# Patient Record
Sex: MALE | Race: BLACK OR AFRICAN AMERICAN
[De-identification: names, ages, dates, MRNs, and addresses within clinical notes are randomized per-mention and may not be internally consistent; named-entity substitution may affect disease eponyms.]

---

## 2022-06-23 ENCOUNTER — HOSPITAL ENCOUNTER (OUTPATIENT)
Dept: HOSPITAL 47 - EC | Age: 62
Setting detail: OBSERVATION
LOS: 1 days | Discharge: HOME | End: 2022-06-24
Attending: INTERNAL MEDICINE | Admitting: INTERNAL MEDICINE
Payer: COMMERCIAL

## 2022-06-23 DIAGNOSIS — Z79.899: ICD-10-CM

## 2022-06-23 DIAGNOSIS — F10.10: ICD-10-CM

## 2022-06-23 DIAGNOSIS — F31.9: ICD-10-CM

## 2022-06-23 DIAGNOSIS — Z71.41: ICD-10-CM

## 2022-06-23 DIAGNOSIS — G89.29: ICD-10-CM

## 2022-06-23 DIAGNOSIS — Z71.6: ICD-10-CM

## 2022-06-23 DIAGNOSIS — F14.90: ICD-10-CM

## 2022-06-23 DIAGNOSIS — F20.9: ICD-10-CM

## 2022-06-23 DIAGNOSIS — F41.9: ICD-10-CM

## 2022-06-23 DIAGNOSIS — K21.9: ICD-10-CM

## 2022-06-23 DIAGNOSIS — M54.50: ICD-10-CM

## 2022-06-23 DIAGNOSIS — F17.210: ICD-10-CM

## 2022-06-23 DIAGNOSIS — Z88.6: ICD-10-CM

## 2022-06-23 DIAGNOSIS — Z87.11: ICD-10-CM

## 2022-06-23 DIAGNOSIS — R07.89: Primary | ICD-10-CM

## 2022-06-23 DIAGNOSIS — Z71.51: ICD-10-CM

## 2022-06-23 LAB
ALBUMIN SERPL-MCNC: 3.6 G/DL (ref 3.5–5)
ALP SERPL-CCNC: 66 U/L (ref 38–126)
ALT SERPL-CCNC: 27 U/L (ref 4–49)
ANION GAP SERPL CALC-SCNC: 5 MMOL/L
APTT BLD: 24.9 SEC (ref 22–30)
AST SERPL-CCNC: 52 U/L (ref 17–59)
BASOPHILS # BLD AUTO: 0 K/UL (ref 0–0.2)
BASOPHILS NFR BLD AUTO: 1 %
BUN SERPL-SCNC: 13 MG/DL (ref 9–20)
CALCIUM SPEC-MCNC: 8.4 MG/DL (ref 8.4–10.2)
CHLORIDE SERPL-SCNC: 105 MMOL/L (ref 98–107)
CO2 SERPL-SCNC: 27 MMOL/L (ref 22–30)
EOSINOPHIL # BLD AUTO: 0.2 K/UL (ref 0–0.7)
EOSINOPHIL NFR BLD AUTO: 3 %
ERYTHROCYTE [DISTWIDTH] IN BLOOD BY AUTOMATED COUNT: 5.02 M/UL (ref 4.3–5.9)
ERYTHROCYTE [DISTWIDTH] IN BLOOD: 12.7 % (ref 11.5–15.5)
GLUCOSE SERPL-MCNC: 92 MG/DL (ref 74–99)
HCT VFR BLD AUTO: 43.9 % (ref 39–53)
HGB BLD-MCNC: 14.6 GM/DL (ref 13–17.5)
INR PPP: 1 (ref ?–1.2)
LYMPHOCYTES # SPEC AUTO: 1.1 K/UL (ref 1–4.8)
LYMPHOCYTES NFR SPEC AUTO: 23 %
MAGNESIUM SPEC-SCNC: 2.1 MG/DL (ref 1.6–2.3)
MCH RBC QN AUTO: 29.1 PG (ref 25–35)
MCHC RBC AUTO-ENTMCNC: 33.3 G/DL (ref 31–37)
MCV RBC AUTO: 87.4 FL (ref 80–100)
MONOCYTES # BLD AUTO: 0.3 K/UL (ref 0–1)
MONOCYTES NFR BLD AUTO: 6 %
NEUTROPHILS # BLD AUTO: 3.2 K/UL (ref 1.3–7.7)
NEUTROPHILS NFR BLD AUTO: 66 %
PH UR: 7.5 [PH] (ref 5–8)
PLATELET # BLD AUTO: 175 K/UL (ref 150–450)
POTASSIUM SERPL-SCNC: 4.5 MMOL/L (ref 3.5–5.1)
PROT SERPL-MCNC: 5.6 G/DL (ref 6.3–8.2)
PT BLD: 10.5 SEC (ref 9–12)
SODIUM SERPL-SCNC: 137 MMOL/L (ref 137–145)
SP GR UR: 1.01 (ref 1–1.03)
UROBILINOGEN UR QL STRIP: <2 MG/DL (ref ?–2)
WBC # BLD AUTO: 4.8 K/UL (ref 3.8–10.6)

## 2022-06-23 PROCEDURE — 93005 ELECTROCARDIOGRAM TRACING: CPT

## 2022-06-23 PROCEDURE — 96372 THER/PROPH/DIAG INJ SC/IM: CPT

## 2022-06-23 PROCEDURE — 96360 HYDRATION IV INFUSION INIT: CPT

## 2022-06-23 PROCEDURE — 80061 LIPID PANEL: CPT

## 2022-06-23 PROCEDURE — 83735 ASSAY OF MAGNESIUM: CPT

## 2022-06-23 PROCEDURE — 85610 PROTHROMBIN TIME: CPT

## 2022-06-23 PROCEDURE — 99285 EMERGENCY DEPT VISIT HI MDM: CPT

## 2022-06-23 PROCEDURE — 85730 THROMBOPLASTIN TIME PARTIAL: CPT

## 2022-06-23 PROCEDURE — 84484 ASSAY OF TROPONIN QUANT: CPT

## 2022-06-23 PROCEDURE — 81003 URINALYSIS AUTO W/O SCOPE: CPT

## 2022-06-23 PROCEDURE — 85025 COMPLETE CBC W/AUTO DIFF WBC: CPT

## 2022-06-23 PROCEDURE — 80053 COMPREHEN METABOLIC PANEL: CPT

## 2022-06-23 PROCEDURE — 96361 HYDRATE IV INFUSION ADD-ON: CPT

## 2022-06-23 PROCEDURE — 71046 X-RAY EXAM CHEST 2 VIEWS: CPT

## 2022-06-23 PROCEDURE — 93306 TTE W/DOPPLER COMPLETE: CPT

## 2022-06-23 PROCEDURE — 36415 COLL VENOUS BLD VENIPUNCTURE: CPT

## 2022-06-23 RX ADMIN — CEFAZOLIN SCH MLS/HR: 330 INJECTION, POWDER, FOR SOLUTION INTRAMUSCULAR; INTRAVENOUS at 14:29

## 2022-06-23 RX ADMIN — HEPARIN SODIUM SCH UNIT: 5000 INJECTION INTRAVENOUS; SUBCUTANEOUS at 23:52

## 2022-06-23 RX ADMIN — HEPARIN SODIUM SCH UNIT: 5000 INJECTION INTRAVENOUS; SUBCUTANEOUS at 18:52

## 2022-06-23 NOTE — ED
Chest Pain HPI





- General


Chief Complaint: Chest Pain


Stated Complaint: Chest Pain


Time Seen by Provider: 06/23/22 10:38


Source: patient, EMS


Mode of arrival: EMS


Limitations: no limitations





- History of Present Illness


Initial Comments: 


Patient is a 61-year-old male presenting with chief complaint of chest pain.  

Patient describes it as a substernal pain, he says it feels a combination of 

squeezing and needles stabbing him.  He states that it comes and goes and has 

been ongoing for the last 2 days.  He notices some radiation to the right arm 

and shoulder. He states that it randomly comes on, he notices no relationship 

between exertion and the pain.  Patient has a history of gastric ulcers. Patient

is arriving via EMS from [place], states that his last crack cocaine use 

was on the 15th.  He received 2 doses of nitro and 324 of aspirin prior to 

arrival.  He is also complaining of lower back pain with radiation down the left

leg.  He denies any loss of bowel or bladder control or saddle paresthesia.  No 

lower extremity weakness.  He denies any shortness of breath, fever, chills, 

nausea, vomiting, abdominal pain, back pain, headache, vision or hearing 

changes, dizziness, weakness.








- Related Data


                                Home Medications











 Medication  Instructions  Recorded  Confirmed


 


Acetaminophen [Tylenol 8 Hour] 650 mg PO Q4H PRN 06/23/22 06/23/22


 


Calc/Mag/Zinc/Vitam D 1 tab PO DAILY PRN 06/23/22 06/23/22


 


Chlorpheniramine Maleate 4 mg PO Q4H PRN 06/23/22 06/23/22





[Chlor-Trimeton]   


 


Docusate [Colace] 100 mg PO DAILY PRN 06/23/22 06/23/22


 


Loperamide HCl [Imodium A-D] 4 mg PO QID PRN 06/23/22 06/23/22


 


Mag Hydrox/Aluminum Hyd/Simeth 30 ml PO Q4H PRN 06/23/22 06/23/22





[Mylanta Maximum Strength Liq]   


 


Methyl Salicylate/Menthol [Icy Hot 1 applic TOPICAL AS DIRECTED PRN 06/23/22 06/23/22





10-30% Cream]   


 


Nicotine 14Mg/24Hr Patch [Habitrol 1 patch TRANSDERM DAILY 06/23/22 06/23/22





14Mg/24Hr Patch]   


 


Omeprazole 20 mg PO DAILY 06/23/22 06/23/22


 


ondansetron HCL [Zofran] 8 mg PO Q6H PRN 06/23/22 06/23/22


 


traZODone HCL [Desyrel] 50 - 150 mg PO HS PRN 06/23/22 06/23/22











                                    Allergies











Allergy/AdvReac Type Severity Reaction Status Date / Time


 


ibuprofen [From Motrin] AdvReac  Vomiting Verified 06/23/22 13:43














Review of Systems


ROS Statement: 


Those systems with pertinent positive or pertinent negative responses have been 

documented in the HPI.





ROS Other: All systems not noted in ROS Statement are negative.





EKG Findings





- EKG Comments:


EKG Findings:: Sinus rhythm with sinus arrhythmia rate of 72.  NC interval 124. 

QRS duration 81.  ST elevation seen in V2.  Reciprocal changes seen in leads III

and aVL. No previous EKG for comparison.  This EKG was also shown to and 

interpreted by my attending Dr. Kirk





Past Medical History


Additional Past Medical History / Comment(s): stomach ulcers.


History of Any Multi-Drug Resistant Organisms: None Reported


Smoking Status: Former smoker


Past Alcohol Use History: Occasional


Past Drug Use History: Cocaine, Marijuana





General Exam


Limitations: no limitations


General appearance: alert, in no apparent distress


Head exam: Present: atraumatic, normocephalic, normal inspection


Eye exam: Present: normal appearance, EOMI.  Absent: scleral icterus, 

periorbital swelling


Neck exam: Present: normal inspection


Respiratory exam: Present: normal lung sounds bilaterally.  Absent: respiratory 

distress, wheezes, rales, rhonchi, stridor


Cardiovascular Exam: Present: regular rate, normal rhythm, normal heart sounds. 

Absent: systolic murmur, diastolic murmur, rubs, gallop, clicks


GI/Abdominal exam: Present: soft, normal bowel sounds.  Absent: distended, 

tenderness, guarding, rebound, rigid


Extremities exam: Present: normal inspection, full ROM.  Absent: tenderness


Neurological exam: Present: alert, oriented X3, CN II-XII intact


  ** Expanded


Patient oriented to: Present: person, place, time


Speech: Present: fluid speech


Cranial nerves: EOM's Intact: Normal, Facial Sensation: Normal


Sensory exam: Upper Extremity Light Touch: Normal, Lower Extremity Light Touch: 

Normal


Motor strength exam: RUE: 5, LUE: 5, RLE: 5, LLE: 5


Eye Response: (4) open spontaneously


Motor Response: (6) obeys commands


Verbal Response: (5) oriented


Eda Total: 15


Psychiatric exam: Present: normal affect, normal mood


Skin exam: Present: warm, dry, intact, normal color.  Absent: rash





Course


                                   Vital Signs











  06/23/22 06/23/22 06/23/22





  10:26 12:37 15:00


 


Temperature 97.6 F  


 


Pulse Rate 77 84 84


 


Respiratory 18 18 18





Rate   


 


Blood Pressure 114/79 101/69 118/74


 


O2 Sat by Pulse 99 98 98





Oximetry   














  06/23/22





  17:00


 


Temperature 


 


Pulse Rate 85


 


Respiratory 18





Rate 


 


Blood Pressure 118/80


 


O2 Sat by Pulse 100





Oximetry 














Chest Pain MDM





- MDM


Patient is a 61-year-old male presenting with chief complaint of chest pain.  

This has been going on for the last 2 days, located substernally and feels a 

sharp squeezing sensation.  Some radiation to the right arm and down the right 

leg.  Patient also admits to chronic back pain with no red flag symptoms.  On 

examination there is pain with palpation of the chest wall.  Lab work is 

unremarkable.  Troponin is not elevated.  Urine is unremarkable.  Chest x-ray s

hows no acute process.  Patient is resting comfortably.  Given his heart score 4

and EKG findings, the best course of action would be to admit for observation 

with cardiac consult.  I spoke with Dr. Stephens who agreed to admit the patient.

 Patient is agreeable with this plan.  I discussed this case with my attending 

Dr. Kirk.








Disposition


Clinical Impression: 


 Chest pain





Disposition: ADMITTED AS IP TO THIS [facility]


Condition: Fair


Time of Disposition: 13:31


Decision to Admit Reason: Admit from EC


Decision Date: 06/23/22


Decision Time: 13:31

## 2022-06-23 NOTE — XR
EXAMINATION TYPE: XR chest 2V

 

DATE OF EXAM: 6/23/2022

 

COMPARISON: None

 

INDICATION: Chest pain x2 days

 

TECHNIQUE:  Frontal and lateral views of the chest are obtained.

 

FINDINGS:  

The heart size is normal.  

The pulmonary vasculature is normal.

The lungs are clear.

 

IMPRESSION:  

1. No acute pulmonary process.

## 2022-06-23 NOTE — P.HPIM
History of Present Illness


H&P Date: 06/23/22








History of Presenting Illness:





Patient is a very pleasant 61-year-old male with a past medical history of GERD,

gastric ulcer, alcohol abuse, nicotine dependence, and crack cocaine use.  He 

presented to the emergency department from [facility] with a chief complaint 

of chest pain.  Patient reports pain is to midsternal region and describes this 

sharp and stabbing and radiating into his back and up into his right shoulder 

and then all the way down into his right leg and toes.  Patient reports this 

pain was sudden onset and denies having any dizziness, lightheadedness, di

aphoresis, palpitations, shortness of breath, nausea, vomiting, or experiencing 

any numbness or focal weakness.  Upon arrival to the emergency department 

patient underwent full evaluation.  EKG revealing sinus rhythm at 72 bpm with T-

wave inversion in leads III and minimal ST elevation in lead aVL and V2.  Chest 

x-ray negative for acute cardiopulmonary process.  CBC, coags, and CMP 

unremarkable.  Urinalysis negative for infection.  Troponin negative at less 

than 0.012.  Patient was admitted under our services with consultation to 

cardiology.





Review of systems:





Pertinent positives and negatives as discussed in HPI, a complete review of 

systems was performed and all other systems are negative.





Physical exam:





Vital signs reviewed and stable. 


General: Nontoxic, no distress and appears stated age.  


Derm: Skin warm and dry, normal coloration for ethnicity.


Head: Atraumatic, normocephalic and symmetric.  


Eyes: EOMs intact, no lid lag, and anicteric sclera


Mouth: no lip lesions, mucus membranes moist


Cardiovascular: regular rate and rhythm with normal S1S2, no murmur, positive 

posterior tibial pulses bilaterally, and cap refill < 2 seconds.  


Lungs: Respirations even, regular, and unlabored on room air. Lungs CTA 

bilaterally, no rhonchi, no rales, no wheezing, and no accessory muscle usage. 


Abdominal: soft, nontender to palpation, no guarding, no appreciable 

organomegaly


Ext: ROM intact. No gross muscle atrophy, no edema, no contractures


Neuro: Speech clear, face symmetrical and CN II-XII grossly intact with no noted

focal neuro deficits


Psych: Alert and oriented to person, place, time, and situation. Appropriate and

pleasant affect. 





Assessment and Plan of Care:





Chest pain, rule out acute coronary event


-Cardiology consult, appreciate further recommendations


-Telemetry monitoring


-Trend troponins


-Cardiac diet, NPO at midnight


-Aspirin and atorvastatin


-Lipid profile with a.m. labs. 


-Echocardiogram





GERD


-Continue daily PPI with Protonix





Nicotine dependence


-Recommend smoking cessation.


-Nicotine patch





Crack cocaine use and history of alcohol abuse


-Recommend cessation of use and returning to [facility] upon discharge.








The patient is admitted with an anticipated less than 2 midnight stay for 

evaluation of a pleasant 61-year-old male.


CODE STATUS: Full code


DVT prophylaxis: Heparin


Discussed with: Patient


Anticipated discharge date: Tomorrow morning


Anticipated discharge place: Home


A total of 40 minutes was spent on the care of this complex patient more than 

50% of the time was spent in counseling and care coordination.














Past Medical History


Additional Past Medical History / Comment(s): stomach ulcers.


History of Any Multi-Drug Resistant Organisms: None Reported


Smoking Status: Former smoker


Past Alcohol Use History: Occasional


Past Drug Use History: Cocaine, Marijuana





Medications and Allergies


                                Home Medications











 Medication  Instructions  Recorded  Confirmed  Type


 


Acetaminophen [Tylenol 8 Hour] 650 mg PO Q4H PRN 06/23/22 06/23/22 History


 


Calc/Mag/Zinc/Vitam D 1 tab PO DAILY PRN 06/23/22 06/23/22 History


 


Chlorpheniramine Maleate 4 mg PO Q4H PRN 06/23/22 06/23/22 History





[Chlor-Trimeton]    


 


Docusate [Colace] 100 mg PO DAILY PRN 06/23/22 06/23/22 History


 


Loperamide HCl [Imodium A-D] 4 mg PO QID PRN 06/23/22 06/23/22 History


 


Mag Hydrox/Aluminum Hyd/Simeth 30 ml PO Q4H PRN 06/23/22 06/23/22 History





[Mylanta Maximum Strength Liq]    


 


Methyl Salicylate/Menthol [Icy Hot 1 applic TOPICAL AS DIRECTED PRN 06/23/22 06 /23/22 History





10-30% Cream]    


 


Nicotine 14Mg/24Hr Patch [Habitrol 1 patch TRANSDERM DAILY 06/23/22 06/23/22 

History





14Mg/24Hr Patch]    


 


Omeprazole 20 mg PO DAILY 06/23/22 06/23/22 History


 


ondansetron HCL [Zofran] 8 mg PO Q6H PRN 06/23/22 06/23/22 History


 


traZODone HCL [Desyrel] 50 - 150 mg PO HS PRN 06/23/22 06/23/22 History








                                    Allergies











Allergy/AdvReac Type Severity Reaction Status Date / Time


 


ibuprofen [From Motrin] AdvReac  Vomiting Verified 06/23/22 13:43














Physical Exam


Vitals: 


                                   Vital Signs











  Temp Pulse Resp BP Pulse Ox


 


 06/23/22 12:37   84  18  101/69  98


 


 06/23/22 10:26  97.6 F  77  18  114/79  99








                                Intake and Output











 06/22/22 06/23/22 06/23/22





 22:59 06:59 14:59


 


Other:   


 


  Weight   91.172 kg














Results


CBC & Chem 7: 


                                 06/23/22 11:01





                                 06/23/22 11:01


Labs: 


                  Abnormal Lab Results - Last 24 Hours (Table)











  06/23/22 Range/Units





  11:01 


 


Total Protein  5.6 L  (6.3-8.2)  g/dL

## 2022-06-24 VITALS
DIASTOLIC BLOOD PRESSURE: 78 MMHG | HEART RATE: 74 BPM | RESPIRATION RATE: 18 BRPM | SYSTOLIC BLOOD PRESSURE: 115 MMHG | TEMPERATURE: 97.8 F

## 2022-06-24 LAB
ALBUMIN SERPL-MCNC: 3.4 G/DL (ref 3.8–4.9)
ALBUMIN/GLOB SERPL: 2.43 G/DL (ref 1.6–3.17)
ALP SERPL-CCNC: 70 U/L (ref 41–126)
ALT SERPL-CCNC: 29 U/L (ref 10–49)
ANION GAP SERPL CALC-SCNC: 10.1 MMOL/L (ref 10–18)
AST SERPL-CCNC: 41 U/L (ref 14–35)
BASOPHILS # BLD AUTO: 0.02 X 10*3/UL (ref 0–0.1)
BASOPHILS NFR BLD AUTO: 0.5 %
BUN SERPL-SCNC: 7.6 MG/DL (ref 9–27)
BUN/CREAT SERPL: 7.6 RATIO (ref 12–20)
CALCIUM SPEC-MCNC: 8.4 MG/DL (ref 8.7–10.3)
CHLORIDE SERPL-SCNC: 107 MMOL/L (ref 96–109)
CHOLEST SERPL-MCNC: 144 MG/DL (ref 0–200)
CO2 SERPL-SCNC: 22.9 MMOL/L (ref 20–27.5)
EOSINOPHIL # BLD AUTO: 0.16 X 10*3/UL (ref 0.04–0.35)
EOSINOPHIL NFR BLD AUTO: 4.1 %
ERYTHROCYTE [DISTWIDTH] IN BLOOD BY AUTOMATED COUNT: 5.2 X 10*6/UL (ref 4.4–5.6)
ERYTHROCYTE [DISTWIDTH] IN BLOOD: 12.2 % (ref 11.5–14.5)
GLOBULIN SER CALC-MCNC: 1.4 G/DL (ref 1.6–3.3)
GLUCOSE SERPL-MCNC: 86 MG/DL (ref 70–110)
HCT VFR BLD AUTO: 43.8 % (ref 39.6–50)
HDLC SERPL-MCNC: 45.8 MG/DL (ref 40–60)
HGB BLD-MCNC: 14.1 G/DL (ref 13–17)
IMM GRANULOCYTES BLD QL AUTO: 0.5 %
LDLC SERPL CALC-MCNC: 79.4 MG/DL (ref 0–131)
LYMPHOCYTES # SPEC AUTO: 1.37 X 10*3/UL (ref 0.9–5)
LYMPHOCYTES NFR SPEC AUTO: 35.5 %
MCH RBC QN AUTO: 27.1 PG (ref 27–32)
MCHC RBC AUTO-ENTMCNC: 32.2 G/DL (ref 32–37)
MCV RBC AUTO: 84.2 FL (ref 80–97)
MONOCYTES # BLD AUTO: 0.28 X 10*3/UL (ref 0.2–1)
MONOCYTES NFR BLD AUTO: 7.3 %
NEUTROPHILS # BLD AUTO: 2.01 X 10*3/UL (ref 1.8–7.7)
NEUTROPHILS NFR BLD AUTO: 52.1 %
NRBC BLD AUTO-RTO: 0 /100 WBCS (ref 0–0)
PLATELET # BLD AUTO: 170 X 10*3/UL (ref 140–440)
POTASSIUM SERPL-SCNC: 5 MMOL/L (ref 3.5–5.5)
PROT SERPL-MCNC: 4.8 G/DL (ref 6.2–8.2)
SODIUM SERPL-SCNC: 140 MMOL/L (ref 135–145)
TRIGL SERPL-MCNC: 94.1 MG/DL (ref 0–149)
VLDLC SERPL CALC-MCNC: 18.82 MG/DL (ref 5–40)
WBC # BLD AUTO: 3.86 X 10*3/UL (ref 4.5–10)

## 2022-06-24 RX ADMIN — HEPARIN SODIUM SCH UNIT: 5000 INJECTION INTRAVENOUS; SUBCUTANEOUS at 09:33

## 2022-06-24 RX ADMIN — CEFAZOLIN SCH MLS/HR: 330 INJECTION, POWDER, FOR SOLUTION INTRAMUSCULAR; INTRAVENOUS at 06:30

## 2022-06-24 NOTE — CA
Transthoracic Echo Report 

 Name: Nitin Steward 

 MRN:    [id number] 

 Age:    61     Gender:     M 

 

 :    1960 

 Exam Date:     2022 14:34 

 Exam Location: [facility] 

 Ht (in):     72     Wt (lb):     201 

 Ordering Physician:        Oral Ryan 

 Attending/Referring Phys: 

 Technician         Sherlyn Mccall RDCS 

 Procedure CPT: 

 Indications:       Chest Pain 

 

 Cardiac Hx: 

 Technical Quality:      Good 

 Contrast 1:                                Total Dose (mL): 

 Contrast 2:                                Total Dose (mL): 

 

 MEASUREMENTS  (Male / Female) Normal Values 

 2D ECHO 

 LV Diastolic Diameter PLAX        4.3 cm                4.2 - 5.9 / 3.9 - 5.3 cm 

 LV Systolic Diameter PLAX         3.2 cm                 

 IVS Diastolic Thickness           1.1 cm                0.6 - 1.0 / 0.6 - 0.9 cm 

 LVPW Diastolic Thickness          1.1 cm                0.6 - 1.0 / 0.6 - 0.9 cm 

 LV Relative Wall Thickness        0.5                    

 RV Internal Dim ED PLAX           2.6 cm                 

 LA Systolic Diameter LX           3.4 cm                3.0 - 4.0 / 2.7 - 3.8 cm 

 

 M-MODE 

 MV E Point Septal Separation      0.8 cm                 

 

 DOPPLER 

 MV Area PHT                       3.1 cm???                

 Mitral E Point Velocity           77.3 cm/s              

 Mitral A Point Velocity           87.5 cm/s              

 Mitral E to A Ratio               0.9                    

 MV Deceleration Time              242.1 ms               

 MV E' Velocity                    9.5 cm/s               

 Mitral E to MV E' Ratio           8.1                    

 TR Peak Velocity                  214.7 cm/s             

 TR Peak Gradient                  18.4 mmHg              

 Right Ventricular Systolic Press  23.4 mmHg              

 

 

 FINDINGS 

 Left Ventricle 

 Normal left ventricular size, wall thickness, systolic function with no obvious  

 regional wall motion abnormalities. The ejection fraction is visually estimated  

 at   50-55. 

 

 Right Ventricle 

 The right ventricle is normal in size and function. 

 

 Right Atrium 

 The right atrium is normal in size. 

 

 Left Atrium 

 The left atrium is normal in size. 

 

 Mitral Valve 

 Structurally normal mitral valve without significant stenosis or prolapse.   

 There is mlld mitral regurgitation. 

 

 Aortic Valve 

 Structurally normal aortic valve without significant sclerosis or stenosis.   

 There is mild aortic regurgitation. 

 

 Tricuspid Valve 

 Structurally normal tricuspid valve without significant stenosis.  Pulmonary  

 artery systolic pressure is normal. 

 

 Pulmonic Valve 

 Structurally normal pulmonic valve without significant stenosis.  There is no  

 pulmonic regurgitation. 

 

 Pericardium 

 Normal pericardium without effusion. 

 

 Aorta 

 Normal aortic root dimension. 

 

 CONCLUSIONS 

 Normal LV systolic function 

 Mildly prominent posterior pericardial stripe 

 Previewed by:  

 Dr. Ajith Beltran MD 

 (Electronically Signed) 

 Final Date:      2022 09:39

## 2022-06-24 NOTE — P.DS
Providers


Date of admission: 


06/23/22 13:28





Expected date of discharge: 06/24/22


Attending physician: 


Isaura Stephens DO





Consults: 





                                        





06/23/22 13:28


Consult Physician Urgent 


   Consulting Provider: [facility]


   Consult Reason/Comments: chest pain


   Do you want consulting provider notified?: Yes











Primary care physician: 


Stated None





Hospital Course: 





Discharge Diagnosis:





Chest pain, acute coronary syndrome ruled out, patient started on atorvastatin 

20 mg nightly.





GERD.  Continue daily PPI with omeprazole.





Nicotine dependence. Recommend smoking cessation.





Crack cocaine use and history of alcohol abuse. Recommend continued cessation of

use and returning to [facility] upon discharge.





Hospital Course: 





Patient is a very pleasant 61-year-old male with a past medical history of GERD,

gastric ulcer, alcohol abuse, nicotine dependence, and crack cocaine use.  He 

presented to the emergency department from [place] with a chief complaint 

of chest pain.  Patient reports pain is to midsternal region and describes this 

sharp and stabbing and radiating into his back and up into his right shoulder 

and then all the way down into his right leg and toes.  Patient reports this 

pain was sudden onset and denies having any dizziness, lightheadedness, 

diaphoresis, palpitations, shortness of breath, nausea, vomiting, or 

experiencing any numbness or focal weakness.  Upon arrival to the emergency 

department patient underwent full evaluation.  EKG revealing sinus rhythm at 72 

bpm with T-wave inversion in leads III and minimal ST elevation in lead aVL and 

V2.  Chest x-ray negative for acute cardiopulmonary process.  CBC, coags, and 

CMP unremarkable.  Urinalysis negative for infection.  Troponin negative at less

than 0.012.  Patient was admitted under our services with consultation to 

cardiology.  Troponins were trended throughout the night all negative at less 

than 0.0123 draws.  Echocardiogram was completed revealing normal EF of 50-55%.

 Cardiology recommending patient be started on low-dose statin atorvastatin 20 

mg nightly and to follow up in their office in 2 weeks.  Patient evaluated at 

bedside and is currently free from any chest pain or other complaints at this 

time.vital signs are unremarkable with blood pressure of 115/78, heart rate 74, 

respiratory rate 18, temp 97.8F, and SpO2 of 99% on room air.  Patient is 

medically stable for discharge.  Patient was recommended to return to [facility] and to continue cessation of crack cocaine use, alcohol use, and nicotine.

 Patient recommended to follow-up outpatient with PCP and cardiology as 

directed.





Physical exam:





Vital signs reviewed and stable. 


General: Nontoxic, no distress and appears stated age.  


Derm: Skin warm and dry, normal coloration for ethnicity.


Head: Atraumatic, normocephalic and symmetric.  


Eyes: EOMs intact, no lid lag, and anicteric sclera


Mouth: no lip lesions, mucus membranes moist


Cardiovascular: regular rate and rhythm with normal S1S2, no murmur, positive 

posterior tibial pulses bilaterally, and cap refill < 2 seconds.  


Lungs: Respirations even, regular, and unlabored on room air. Lungs CTA 

bilaterally, no rhonchi, no rales, no wheezing, and no accessory muscle usage. 


Abdominal: soft, nontender to palpation, no guarding, no appreciable 

organomegaly


Ext: ROM intact. No gross muscle atrophy, no edema, no contractures


Neuro: Speech clear, face symmetrical and CN II-XII grossly intact with no noted

focal neuro deficits


Psych: Alert and oriented to person, place, time, and situation. Appropriate and

pleasant affect. 








A total of 31 minutes of time were spent preparing this complex discharge 

summary.





Pt was discharged on 6/24/22 at 9:57 AM.








Patient Condition at Discharge: Stable





Plan - Discharge Summary


New Discharge Prescriptions: 


New


   Atorvastatin [Lipitor] 20 mg PO HS 30 Days #30 tab





Continue


   Nicotine 14Mg/24Hr Patch [Habitrol] 1 patch TRANSDERM DAILY


   Acetaminophen [Tylenol 8 Hour] 650 mg PO Q4H PRN


     PRN Reason: Pain


   Loperamide HCl [Imodium A-D] 4 mg PO QID PRN


     PRN Reason: Loose Stool


   Docusate [Colace] 100 mg PO DAILY PRN


     PRN Reason: Constipation


   traZODone HCL [Desyrel] 50 - 150 mg PO HS PRN


     PRN Reason: Insomnia


   ondansetron HCL [Zofran] 8 mg PO Q6H PRN


     PRN Reason: Nausea


   Mag Hydrox/Aluminum Hyd/Simeth [Mylanta Maximum Strength Liq] 30 ml PO Q4H 

PRN


     PRN Reason: Gi Upset


   Methyl Salicylate/Menthol [Icy Hot 10-30% Cream] 1 applic TOPICAL AS DIRECTED

PRN


     PRN Reason: Pain


   Chlorpheniramine Maleate [Chlor-Trimeton] 4 mg PO Q4H PRN


     PRN Reason: Allergy Symptoms


   Omeprazole 20 mg PO DAILY


   Calc/Mag/Zinc/Vitam D 1 tab PO DAILY PRN


     PRN Reason: CRAMPS


Discharge Medication List





Acetaminophen [Tylenol 8 Hour] 650 mg PO Q4H PRN 06/23/22 [History]


Calc/Mag/Zinc/Vitam D 1 tab PO DAILY PRN 06/23/22 [History]


Chlorpheniramine Maleate [Chlor-Trimeton] 4 mg PO Q4H PRN 06/23/22 [History]


Docusate [Colace] 100 mg PO DAILY PRN 06/23/22 [History]


Loperamide HCl [Imodium A-D] 4 mg PO QID PRN 06/23/22 [History]


Mag Hydrox/Aluminum Hyd/Simeth [Mylanta Maximum Strength Liq] 30 ml PO Q4H PRN 

06/23/22 [History]


Methyl Salicylate/Menthol [Icy Hot 10-30% Cream] 1 applic TOPICAL AS DIRECTED 

PRN 06/23/22 [History]


Nicotine 14Mg/24Hr Patch [Habitrol] 1 patch TRANSDERM DAILY 06/23/22 [History]


Omeprazole 20 mg PO DAILY 06/23/22 [History]


ondansetron HCL [Zofran] 8 mg PO Q6H PRN 06/23/22 [History]


traZODone HCL [Desyrel] 50 - 150 mg PO HS PRN 06/23/22 [History]


Atorvastatin [Lipitor] 20 mg PO HS 30 Days #30 tab 06/24/22 [Rx]








Follow up Appointment(s)/Referral(s): 


Ajith Beltran MD [STAFF PHYSICIAN] - 1 Week


Lalo Tavares MD [REFERRING] - 1 Week


Patient Instructions/Handouts:  Chest Pain (DC), How to Stop Smoking (DC), 

Cigarette Smoking and Your Health (GEN), Cocaine Abuse (DC)


Activity/Diet/Wound Care/Special Instructions: 





Activity:





As tolerated.  Take breaks as needed.





Diet: 





Heart healthy and carb consistent diet. Avoid salts, or foods with hidden salts 

such as canned or boxed foods and frozen dinners. Extra salt makes your heart 

work harder and traps the fluid in your body for longer.





Special Instructions:  





Take all of your medications as directed and remember to keep all of your 

doctor's appointments and follow-up as needed.  





Highly recommend returning to [facility] and continued cessation of crack 

cocaine use and alcohol use.





Strongly encourage smoking cessation.





Thank you for allowing us to participate in your care, it was truly a pleasure 

having you for our patient!!! 








Discharge Disposition: HOME SELF-CARE

## 2022-06-24 NOTE — P.CRDCN
History of Present Illness


History of present illness: 


HISTORY OF PRESENTING ILLNESS


This is a pleasant 61-year-old male past medical history significant for chronic

nicotine dependence, cocaine use, alcohol use, GERD. He does not follow with a c

ardiologist. We have been asked to see in consultation for chest pain. Patient 

presents to the ER with abdominal pain and chest pain.  Patient reports 

midsternal and epigastric pain.  He describes it as sharp, stabbing.  He did 

have some radiation to his back and his shoulders, also endorses some discomfort

in his right leg. He states this was sudden, resolved on its own. No specific 

alleviating or aggravating factors. His pain has resolved.  He denies any 

associated shortness of breath, lightheadedness, dizziness, palpitations, 

diaphoresis, nausea, vomiting.  He denies any headache, weakness.  He denies any

history of CAD, MI, stroke, hypertension, diabetes. He does have a history of 

cocaine use and alcohol abuse. He does currently smoke cigarettes. 





DIAGNOSTICS


EKG reveals sinus rhythm, heart rate 72, T wave inversion in lead III, 

nonspecific, early repolarization in leads V2 and V3.  EKG this morning revealed

sinus rhythm, J-point elevation in early repolarization in inferior leads.  No 

acute ischemia noted.  No prior EKGs to compare.


Chest x-ray revealed no acute cardiopulmonary process


Telemetry tracings indicate sinus mechanism, no arrhythmia noted


Labs reviewed, troponin negative 3, CBC unremarkable, sodium 137, potassium 

4.5, BUN 13, serum crit 0.9, urinalysis negative


Current home medications include trazodone, Zofran, omeprazole, nicotine patch, 

Imodium, Colace 





REVIEW OF SYSTEMS


At the time of my exam patient's symptoms have resolved.


CONSTITUTIONAL: Denies fever or chills.


CARDIOVASCULAR: Denies chest pain, shortness of breath, orthopnea, PND or 

palpitations.


RESPIRATORY: Denies cough. 


GASTROINTESTINAL: Denies abdominal pain, diarrhea, constipation, nausea or 

vomiting.


MUSCULOSKELETAL: Denies myalgias.


NEUROLOGIC: Denies numbness, tingling, headache or weakness.


ENDOCRINE: Denies fatigue, weight change,  polydipsia or polyurina.


GENITOURINARY: Denies burning, hematuria or urgency with micturation.


HEMATOLOGIC: Denies history of anemia or bleeding. 





PHYSICAL EXAMINATION


Blood pressure 115/70, heart rate 74, afebrile, saturation 90% on room air


CONSTITUTIONAL: No apparent distress. 


HEENT: Head is normocephalic. Pupils are equal, round. Sclerae anicteric. Mucous

membranes of the mouth are moist.  No JVD. No carotid bruit.


CHEST EXAMINATION: Lungs are clear to auscultation. No chest wall tenderness is 

noted on palpation or with deep breathing. 


HEART EXAMINATION: Regular rate and rhythm. S1, S2 heard. No murmurs, gallops or

rub.


ABDOMEN: Soft, nontender. Positive bowel sounds.


EXTREMITIES: 2+ peripheral pulses, no lower extremity edema and no calf 

tenderness.


SKIN: Warm, dry


NEUROLOGIC EXAMINATION: Patient is awake, alert and oriented x3. 





ASSESSMENT


Chest pain, atypical, acute coronary syndrome has been ruled out


GERD


Chronic nicotine dependence


Cocaine use


History of alcohol abuse 





PLAN


An acute coronary event has been ruled out with no EKG evidence of ischemia and 

negative cardiac enzymes. 


Echocardiogram revealed EF of 5055 percent, mild aortic regurgitation


Recommend continuing statin 


No further inpatient workup from a cardiology perspective,  ok to discharge from

a cardiology perspective. 


Smoking cessation discussed and highly recommended. 





Thank you kindly for this consultation. 


Nurse practitioner note has been reviewed by physician. Signing provider agrees 

with the documented findings, assessment, and plan of care. 











Past Medical History


Additional Past Medical History / Comment(s): stomach ulcers.


History of Any Multi-Drug Resistant Organisms: None Reported


Additional Past Surgical History / Comment(s): Stomach surgery for ulcers.


Past Anesthesia/Blood Transfusion Reactions: No Reported Reaction


Past Psychological History: Anxiety, Bipolar, Depression, Schizophrenia


Smoking Status: Current every day smoker


Past Alcohol Use History: Abuse


Past Drug Use History: Cocaine, Marijuana





Medications and Allergies


                                Home Medications











 Medication  Instructions  Recorded  Confirmed  Type


 


Acetaminophen [Tylenol 8 Hour] 650 mg PO Q4H PRN 06/23/22 06/23/22 History


 


Calc/Mag/Zinc/Vitam D 1 tab PO DAILY PRN 06/23/22 06/23/22 History


 


Chlorpheniramine Maleate 4 mg PO Q4H PRN 06/23/22 06/23/22 History





[Chlor-Trimeton]    


 


Docusate [Colace] 100 mg PO DAILY PRN 06/23/22 06/23/22 History


 


Loperamide HCl [Imodium A-D] 4 mg PO QID PRN 06/23/22 06/23/22 History


 


Mag Hydrox/Aluminum Hyd/Simeth 30 ml PO Q4H PRN 06/23/22 06/23/22 History





[Mylanta Maximum Strength Liq]    


 


Methyl Salicylate/Menthol [Icy Hot 1 applic TOPICAL AS DIRECTED PRN 06/23/22 06/23/22 History





10-30% Cream]    


 


Nicotine 14Mg/24Hr Patch [Habitrol] 1 patch TRANSDERM DAILY 06/23/22 06/23/22 

History


 


Omeprazole 20 mg PO DAILY 06/23/22 06/23/22 History


 


ondansetron HCL [Zofran] 8 mg PO Q6H PRN 06/23/22 06/23/22 History


 


traZODone HCL [Desyrel] 50 - 150 mg PO HS PRN 06/23/22 06/23/22 History


 


Atorvastatin [Lipitor] 20 mg PO HS 30 Days #30 tab 06/24/22  Rx








                                    Allergies











Allergy/AdvReac Type Severity Reaction Status Date / Time


 


ibuprofen [From Motrin] AdvReac  Vomiting Verified 06/23/22 13:43














Physical Exam


Vitals: 


                                   Vital Signs











  Temp Pulse Pulse Resp BP BP Pulse Ox


 


 06/24/22 02:00    80    


 


 06/24/22 01:44  98.4 F   83  16   105/68  100


 


 06/23/22 20:00    80  16   


 


 06/23/22 19:08  98.7 F   80  16   134/82  98


 


 06/23/22 18:58   85   18  122/84   96


 


 06/23/22 17:00   85   18  118/80   100


 


 06/23/22 15:00   84   18  118/74   98


 


 06/23/22 12:37   84   18  101/69   98


 


 06/23/22 10:26  97.6 F  77   18  114/79   99








                                Intake and Output











 06/23/22 06/24/22 06/24/22





 22:59 06:59 14:59


 


Intake Total  0 


 


Balance  0 


 


Intake:   


 


  Oral  0 


 


Other:   


 


  Voiding Method Toilet  


 


  # Voids 1 2 


 


  Weight 91.172 kg  














Results





                                 06/23/22 11:01





                                 06/23/22 11:01


                                 Cardiac Enzymes











  06/23/22 06/23/22 06/23/22 Range/Units





  11:01 11:01 15:10 


 


AST  52    (17-59)  U/L


 


Troponin I   <0.012  <0.012  (0.000-0.034)  ng/mL














  06/23/22 Range/Units





  17:13 


 


AST   (17-59)  U/L


 


Troponin I  <0.012  (0.000-0.034)  ng/mL








                                   Coagulation











  06/23/22 Range/Units





  11:01 


 


PT  10.5  (9.0-12.0)  sec


 


APTT  24.9  (22.0-30.0)  sec








                                       CBC











  06/23/22 Range/Units





  11:01 


 


WBC  4.8  (3.8-10.6)  k/uL


 


RBC  5.02  (4.30-5.90)  m/uL


 


Hgb  14.6  (13.0-17.5)  gm/dL


 


Hct  43.9  (39.0-53.0)  %


 


Plt Count  175  (150-450)  k/uL








                          Comprehensive Metabolic Panel











  06/23/22 Range/Units





  11:01 


 


Sodium  137  (137-145)  mmol/L


 


Potassium  4.5  (3.5-5.1)  mmol/L


 


Chloride  105  ()  mmol/L


 


Carbon Dioxide  27  (22-30)  mmol/L


 


BUN  13  (9-20)  mg/dL


 


Creatinine  0.94  (0.66-1.25)  mg/dL


 


Glucose  92  (74-99)  mg/dL


 


Calcium  8.4  (8.4-10.2)  mg/dL


 


AST  52  (17-59)  U/L


 


ALT  27  (4-49)  U/L


 


Alkaline Phosphatase  66  ()  U/L


 


Total Protein  5.6 L  (6.3-8.2)  g/dL


 


Albumin  3.6  (3.5-5.0)  g/dL








                               Current Medications











Generic Name Dose Route Start Last Admin





  Trade Name Freq  PRN Reason Stop Dose Admin


 


Acetaminophen  650 mg  06/23/22 14:06  06/23/22 18:57





  Acetaminophen Tab 325 Mg Tab  PO   650 mg





  Q4H PRN   Administration





  MILD Pain  


 


Aspirin  81 mg  06/24/22 09:00 





  Aspirin 81 Mg  PO  





  DAILY MARY  


 


Atorvastatin Calcium  40 mg  06/23/22 21:00  06/23/22 20:58





  Atorvastatin 40 Mg Tab  PO   40 mg





  HS MARY   Administration


 


Docusate Sodium  100 mg  06/23/22 14:06 





  Docusate 100 Mg Cap  PO  





  DAILY PRN  





  Constipation  


 


Heparin Sodium (Porcine)  5,000 unit  06/23/22 17:30  06/23/22 23:52





  Heparin Sodium,Porcine/Pf 5,000 Unit/0.5 Ml Syringe  SQ   5,000 unit





  Q8HR MARY   Administration


 


Sodium Chloride  1,000 mls @ 75 mls/hr  06/23/22 13:30  06/24/22 06:30





  Saline 0.9%  IV   75 mls/hr





  .[id number] MARY   Administration


 


Naloxone HCl  0.2 mg  06/23/22 13:28 





  Naloxone 0.4 Mg/Ml 1 Ml Vial  IV  





  Q2M PRN  





  Opioid Reversal  


 


Nicotine  1 patch  06/24/22 09:00 





  Nicotine 14mg/24hr Patch  TRANSDERM  





  DAILY MARY  


 


Pantoprazole Sodium  40 mg  06/24/22 09:00 





  Pantoprazole 40 Mg Tablet  PO  





  DAILY MARY  








                                Intake and Output











 06/23/22 06/24/22 06/24/22





 22:59 06:59 14:59


 


Intake Total  0 


 


Balance  0 


 


Intake:   


 


  Oral  0 


 


Other:   


 


  Voiding Method Toilet  


 


  # Voids 1 2 


 


  Weight 91.172 kg  








                                        





                                 06/23/22 11:01 





                                 06/23/22 11:01